# Patient Record
Sex: FEMALE | Race: WHITE | NOT HISPANIC OR LATINO | URBAN - METROPOLITAN AREA
[De-identification: names, ages, dates, MRNs, and addresses within clinical notes are randomized per-mention and may not be internally consistent; named-entity substitution may affect disease eponyms.]

---

## 2023-03-15 ENCOUNTER — TRANSFERRED RECORDS (OUTPATIENT)
Dept: HEALTH INFORMATION MANAGEMENT | Facility: CLINIC | Age: 68
End: 2023-03-15

## 2023-04-07 ENCOUNTER — TRANSFERRED RECORDS (OUTPATIENT)
Dept: HEALTH INFORMATION MANAGEMENT | Facility: CLINIC | Age: 68
End: 2023-04-07
Payer: MEDICARE

## 2023-04-11 ENCOUNTER — MEDICAL CORRESPONDENCE (OUTPATIENT)
Dept: HEALTH INFORMATION MANAGEMENT | Facility: CLINIC | Age: 68
End: 2023-04-11
Payer: MEDICARE

## 2023-04-13 ENCOUNTER — TELEPHONE (OUTPATIENT)
Dept: OTOLARYNGOLOGY | Facility: CLINIC | Age: 68
End: 2023-04-13
Payer: MEDICARE

## 2023-04-13 ENCOUNTER — TRANSCRIBE ORDERS (OUTPATIENT)
Dept: OTHER | Age: 68
End: 2023-04-13

## 2023-04-13 DIAGNOSIS — R42 DIZZINESS AND GIDDINESS: Primary | ICD-10-CM

## 2023-04-13 NOTE — TELEPHONE ENCOUNTER
1. Have you noticed any changes in hearing? Yes  2. Do you have ringing, buzzing, or other sounds in your ears or head, this is also referred to as Tinnitus? Yes  3. When and where was your last hearing test? Rahul Garcia @ [facility]  4. Do you feel lightheaded or foggy? Yes  5. Do you have a spinning sensation? Yes  6. Is there any specific position that can bring on dizziness? random  7. Does looking up cause dizziness? No  8. Does getting in and our of bed cause dizziness? No  9. Does turning over in bed increase or cause dizziness? No  10. Does bending over cause dizziness? Sometimes: sometimes  11. Is there anything that you can do to prevent the dizziness? Meds. Reduced diet   12. Has the dizziness gotten better with time? No  13. Have you seen Physical Therapy for dizziness? (Please indicate clinic and as much of the location as possible): No  14. Are you being referred to a specific physician? Yes: [facility]   15. Have you been evaluated/treated for your dizziness at any other location?  (If yes,obtian as much clinic/provider/locaiton as possible) Yes - Rahul Garcia @ [facility]. (If yes answer the following questions:)   Have you seen any ENT, Neurology, or other providers for these symptoms?             No   Have you had any balance or Audiology testing? No Have you had an MRI or CT scan of your head or neck? Yes, If yes, where? [facility]  if yes, who?     Would you like to receive your Release of Information by mail or e-mail?  e-mail

## 2023-04-18 NOTE — TELEPHONE ENCOUNTER
FUTURE VISIT INFORMATION      FUTURE VISIT INFORMATION:    Date: 7/7/23.    Time: 11:30 AM    Location: [facility] [place]  REFERRAL INFORMATION:    Referring provider: Rahul Garcia    Referring providers clinic: [facility]    Reason for visit/diagnosis Vertigo- Referred by Rahul Garcia @ [facility]    RECORDS REQUESTED FROM:       Clinic name Comments Records Status Imaging Status   [facility] 4/7/23 ENT note - Rahul Garcia  4/7/23 audio note- Evangelina Zheng  4/7/23 audiogram Scanned in [facility]    [facility] - [facility] MR brain & IAC 3/15/23 Scanned in [facility] Pending  Req 4/18/23  PACS                             April 18, 2023 at 6:43 AM - request images pushed and forward to Audiology to review -[facility]  April 21, 2023 at 6:40 PM - Image resolved in PACS -[facility]

## 2023-04-26 DIAGNOSIS — Z01.10 ENCOUNTER FOR HEARING TEST: Primary | ICD-10-CM

## 2023-04-26 NOTE — TELEPHONE ENCOUNTER
Requesting orders for VNG, hearing test and ECOG testing prior to patient's appointment with Dr. Kacey Casey on 7/7/2023.      Per record review: Patient is being referred from [facility] in [facility].  Patient initially originally experienced patient appears to be suspect for right Ménière's disease.  Vertiginous episode with sudden right hearing loss successfully treated by steroid injections.  Patient experienced another sudden hearing loss in her right ear not long after but was only able to regain partial return of hearing following another round of steroid injections.  Patient was reportedly referred for VNG testing but this is not available for our review or may not have occurred since pt reported significant anxiety about feeling dizzy away from home.  Patient has been prescribed Valium, Dyazide and has begun a low-sodium diet.    Previous hearing test from [facility] obtained 4/7/2023 revealed mild sloping to moderately severe sensorineural hearing loss in the left ear and asymmetric moderately severe sensorineural hearing loss in the right ear.    Previous MRI performed 3/15/2023 was unremarkable.    Janis Rosales. CCC-A  Vestibular Audiologist   [place] #[id number]

## 2023-04-28 ENCOUNTER — TELEPHONE (OUTPATIENT)
Dept: OTOLARYNGOLOGY | Facility: CLINIC | Age: 68
End: 2023-04-28
Payer: MEDICARE

## 2023-07-07 ENCOUNTER — PRE VISIT (OUTPATIENT)
Dept: OTOLARYNGOLOGY | Facility: CLINIC | Age: 68
End: 2023-07-07